# Patient Record
Sex: MALE | Race: AMERICAN INDIAN OR ALASKA NATIVE
[De-identification: names, ages, dates, MRNs, and addresses within clinical notes are randomized per-mention and may not be internally consistent; named-entity substitution may affect disease eponyms.]

---

## 2019-03-13 ENCOUNTER — HOSPITAL ENCOUNTER (EMERGENCY)
Dept: HOSPITAL 5 - ED | Age: 19
LOS: 1 days | Discharge: HOME | End: 2019-03-14
Payer: COMMERCIAL

## 2019-03-13 DIAGNOSIS — G43.909: Primary | ICD-10-CM

## 2019-03-13 DIAGNOSIS — F12.10: ICD-10-CM

## 2019-03-13 PROCEDURE — 99283 EMERGENCY DEPT VISIT LOW MDM: CPT

## 2019-03-13 PROCEDURE — 70450 CT HEAD/BRAIN W/O DYE: CPT

## 2019-03-13 NOTE — EMERGENCY DEPARTMENT REPORT
ED Headache HPI





- General


Chief Complaint: Headache


Stated Complaint: HA


Time Seen by Provider: 03/13/19 21:21





- History of Present Illness


Initial Comments: 


This is a 18-year-old male presents complaining of right-sided temporal headache

1 week


Patient denies trauma, blurred vision, nausea vomiting.





Timing/Duration: 1 week


Quality: moderate


Head Injury Location: temporal


Recent Head Trauma: no recent headache/trauma, occasional headaches


Associated Symptoms: nasal congestion.  denies: confusion, fatigue, facial pain,

vision changes


Allergies/Adverse Reactions: 


Allergies





Penicillins Allergy (Verified 03/13/19 20:47)


   Hives








Home Medications: 


Ambulatory Orders





Butalb/Acetamin/Caff -40 [Fioricet] 1 tab PO Q6HR PRN #40 tab 03/14/19 


Fluticasone [Flonase] 1 spray NS QDAY #1 bottle 03/14/19 











ED Review of Systems


ROS: 


Stated complaint: HA


Other details as noted in HPI





Comment: All other systems reviewed and negative





ED Past Medical Hx





- Social History


Smoking Status: Never Smoker


Substance Use Type: Marijuana





- Medications


Home Medications: 


                                Home Medications











 Medication  Instructions  Recorded  Confirmed  Last Taken  Type


 


Butalb/Acetamin/Caff -40 1 tab PO Q6HR PRN #40 tab 03/14/19  Unknown Rx





[Fioricet]     


 


Fluticasone [Flonase] 1 spray NS QDAY #1 bottle 03/14/19  Unknown Rx














ED Physical Exam





- General


Limitations: No Limitations


General appearance: alert, in no apparent distress





- Head


Head exam: Present: atraumatic, normocephalic





- Eye


Eye exam: Present: normal appearance, PERRL


Pupils: Present: normal accommodation, other (dilated bilaterally)





- ENT


ENT exam: Present: mucous membranes moist





- Neck


Neck exam: Present: normal inspection





- Respiratory


Respiratory exam: Present: normal lung sounds bilaterally.  Absent: respiratory 

distress





- Cardiovascular


Cardiovascular Exam: Present: regular rate, normal rhythm.  Absent: systolic 

murmur, diastolic murmur, rubs, gallop





- GI/Abdominal


GI/Abdominal exam: Present: soft, normal bowel sounds





- Rectal


Rectal exam: Present: deferred





- Extremities Exam


Extremities exam: Present: normal inspection





- Back Exam


Back exam: Present: normal inspection





- Neurological Exam


Neurological exam: Present: alert, oriented X3, normal gait





- Expanded Neurological Exam


  ** Expanded


Patient oriented to: Present: person, place, time


Speech: Present: fluid speech


Cerebellar function: Finger to Nose: Normal, Heel to Shin: Normal


Sensory exam: Upper Extremity Light Touch: Normal, Lower Extremity Light Touch: 

Normal


Motor strength exam: RUE: 5, LUE: 5, RLE: 5, LLE: 5


DTR: knee (R): 2+, knee (L): 2+


Best Eye Response ([place]): (4) open spontaneously


Best Motor Response ([place]): (6) obeys commands


Best Verbal Response ([place]): (5) oriented


[place] Total: 15





- Psychiatric


Psychiatric exam: Present: normal affect, normal mood





- Skin


Skin exam: Present: warm, dry, intact, normal color.  Absent: rash





ED Course


                                   Vital Signs











  03/13/19 03/14/19





  21:15 00:32


 


Temperature 98.5 F 98.1 F


 


Pulse Rate 99 H 83


 


Respiratory 18 18





Rate  


 


Blood Pressure 127/82 


 


Blood Pressure  141/89





[Left]  


 


O2 Sat by Pulse 99 100





Oximetry  














ED Medical Decision Making





- Medical Decision Making


18-year-old male presents with a migraine tension headache


CT scan of the head shows no acute injury or head bleed.


Patient received pain medication while in the ED.


Discussed the patient will need a referral to neurologist











Critical care attestation.: 


If time is entered above; I have spent that time in minutes in the direct care 

of this critically ill patient, excluding procedure time.








ED Disposition


Clinical Impression: 


 Migraine headache





Disposition: DC-01 TO HOME OR SELFCARE


Is pt being admited?: No


Does the pt Need Aspirin: No


Condition: Stable


Instructions:  Migraine Headache (ED), Tension Headache (ED)


Additional Instructions: 


Make sure to follow up with the primary care physician as discussed.


Take all your medications as you've been prescribed.


If you have any worsening symptoms or develop new symptoms please return to ED 

immediately.


Prescriptions: 


Butalb/Acetamin/Caff -40 [Fioricet] 1 tab PO Q6HR PRN #40 tab


 PRN Reason: Headache


Fluticasone [Flonase] 1 spray NS QDAY #1 bottle


[facility]errals: 


[facility] - 3-5 Days


JOHNSON WOLF MD [Staff Physician] - 3-5 Days


Forms:  Accompanied Note, Work/School Release Form(ED)


Time of Disposition: 00:07

## 2019-03-13 NOTE — CAT SCAN REPORT
PROCEDURE:  CT HEAD/BRAIN WO CON 

 

TECHNIQUE:  Computerized tomography of the head was performed without contrast material.  

 

CT DOSE LENGTH PRODUCT:  805  mGycm 

 

HISTORY:  headache  

 

COMPARISONS:  None . 

 

FINDINGS: 

 

Skull and scalp:  Normal . 

Paranasal sinuses:  Normal . 

Ventricles and subarachnoid spaces:  Normal . 

Cerebrum:  No evidence of hemorrhage, acute infarction or mass . 

Cerebellum and brainstem:  No evidence of hemorrhage, acute infarction or mass . 

Vasculature:  Normal . 

Other:  None . 

ASPECTS: 10 

 

IMPRESSION:  Normal Examination . 

 

This document is electronically signed by Roxy Hector DO., March 13 2019 11:28:22 PM ET

## 2019-03-13 NOTE — EMERGENCY DEPARTMENT REPORT
Chief Complaint: Headache


Stated Complaint: HA


Time Seen by Provider: 03/13/19 21:21





- HPI


History of Present Illness: 





This is a 18 y.o. male that presents with headache for 1 week.  Patient states 

he is taking OTC sinus medication with no improvement of symptoms.





- ROS


Review of Systems: 





headache





- Exam


Vital Signs: 


                                   Vital Signs











  03/13/19





  21:15


 


Temperature 98.5 F


 


Pulse Rate 99 H


 


Respiratory 18





Rate 


 


Blood Pressure 127/82


 


O2 Sat by Pulse 99





Oximetry 











MSE screening note: 


Focused history and physical exam performed.


Due to findings the following was ordered:





CT of head w/o contrast


Fast track for further evaluation.





ED Disposition for MSE


Condition: Stable

## 2019-03-14 VITALS — SYSTOLIC BLOOD PRESSURE: 141 MMHG | DIASTOLIC BLOOD PRESSURE: 89 MMHG
